# Patient Record
Sex: MALE | Race: WHITE | NOT HISPANIC OR LATINO | URBAN - NONMETROPOLITAN AREA
[De-identification: names, ages, dates, MRNs, and addresses within clinical notes are randomized per-mention and may not be internally consistent; named-entity substitution may affect disease eponyms.]

---

## 2019-02-06 ENCOUNTER — IMPORTED ENCOUNTER (OUTPATIENT)
Dept: URBAN - NONMETROPOLITAN AREA CLINIC 1 | Facility: CLINIC | Age: 74
End: 2019-02-06

## 2019-02-06 PROBLEM — H16.223: Noted: 2019-02-06

## 2019-02-06 PROBLEM — H25.13: Noted: 2019-02-06

## 2019-02-06 PROBLEM — E11.9: Noted: 2019-02-06

## 2019-02-06 PROCEDURE — 99212 OFFICE O/P EST SF 10 MIN: CPT

## 2019-02-06 NOTE — PATIENT DISCUSSION
Dry Eye Syndrome OU- Continue current management.- Recommended artificial tears to use as directed. - Discussed lid hygiene warm compress and eyelid wash. - Explained to patient that the glare problem could be due to cataracts and glare test would be performed on his next scheduled visit. *******************************Previous Exam************************************NIDDM s DR LOPEZ-Stressed the importance of keeping blood sugars under control blood pressure under control and weight normalization and regular visits with PCP. -Explained the possible effects of poorly controlled diabetes and the damage that diabetes can cause to ocular health. -Patient to check HgbA1C.-Pt instructed to contact our office with any vision changes. Cataract OU-Not yet surgical. -Reviewed symptoms of advancing cataract growth such as glare and halos and decreased vision.-Continue to monitor for now. Pt will notify us if any new symptoms develop. - Recommend glare test upon return for yearly routine exam.Presbyopia OU-Discussed diagnosis with patient.

## 2019-08-20 ENCOUNTER — IMPORTED ENCOUNTER (OUTPATIENT)
Dept: URBAN - NONMETROPOLITAN AREA CLINIC 1 | Facility: CLINIC | Age: 74
End: 2019-08-20

## 2019-08-20 PROBLEM — E11.9: Noted: 2019-08-20

## 2019-08-20 PROBLEM — H16.223: Noted: 2019-08-20

## 2019-08-20 PROBLEM — H25.13: Noted: 2019-08-20

## 2019-08-20 PROCEDURE — 99214 OFFICE O/P EST MOD 30 MIN: CPT

## 2019-08-20 PROCEDURE — 92250 FUNDUS PHOTOGRAPHY W/I&R: CPT

## 2019-08-20 PROCEDURE — 92015 DETERMINE REFRACTIVE STATE: CPT

## 2019-08-20 NOTE — PATIENT DISCUSSION
NIDDM s DR LOPEZ-Stressed the importance of keeping blood sugars under control blood pressure under control and weight normalization and regular visits with PCP. -Explained the possible effects of poorly controlled diabetes and the damage that diabetes can cause to ocular health. -Patient to check HgbA1C.-Pt instructed to contact our office with any vision changes. Cataract OU-Not yet surgical. -Reviewed symptoms of advancing cataract growth such as glare and halos and decreased vision.-Continue to monitor for now. Pt will notify us if any new symptoms develop. - Recommend glare test upon return for yearly routine exam.Presbyopia OU-Discussed diagnosis with patient. Dry Eye Syndrome OU- Continue current management.- Recommended artificial tears to use as directed. - Discussed lid hygiene warm compress and eyelid wash. - Explained to patient that the glare problem could be due to cataracts and glare test would be performed on his next scheduled visit.

## 2021-04-12 ENCOUNTER — IMPORTED ENCOUNTER (OUTPATIENT)
Dept: URBAN - NONMETROPOLITAN AREA CLINIC 1 | Facility: CLINIC | Age: 76
End: 2021-04-12

## 2021-04-12 PROCEDURE — 99213 OFFICE O/P EST LOW 20 MIN: CPT

## 2021-04-12 PROCEDURE — 92250 FUNDUS PHOTOGRAPHY W/I&R: CPT

## 2021-09-22 ENCOUNTER — IMPORTED ENCOUNTER (OUTPATIENT)
Dept: URBAN - NONMETROPOLITAN AREA CLINIC 1 | Facility: CLINIC | Age: 76
End: 2021-09-22

## 2021-09-22 PROBLEM — I10: Noted: 2021-10-05

## 2021-09-22 PROBLEM — Z01.818: Noted: 2021-10-05

## 2021-09-22 PROBLEM — K21.9: Noted: 2021-10-05

## 2021-09-22 PROBLEM — H25.13: Noted: 2021-09-22

## 2021-09-22 PROBLEM — H16.223: Noted: 2019-08-20

## 2021-09-22 PROBLEM — E11.9: Noted: 2021-09-22

## 2021-09-22 PROBLEM — E78.5: Noted: 2021-10-05

## 2021-09-22 PROBLEM — I25.10: Noted: 2021-10-05

## 2021-09-22 PROCEDURE — 92014 COMPRE OPH EXAM EST PT 1/>: CPT

## 2021-09-22 NOTE — PATIENT DISCUSSION
Cataract(s)-Visually significant cataract OU .-Cataract(s) causing symptomatic impairment of visual function not correctable with a tolerable change in glasses or contact lenses lighting or non-operative means resulting in specific activity limitations and/or participation restrictions including but not limited to reading viewing television driving or meeting vocational or recreational needs. -Expectation is clearer vision and functional improvement in symptoms as well as reduced glare disability after cataract removal.-Order IOLMaster and OPD today. -Recommend Stand/Trad based on today's OPD testing and lifestyle questionnaire.-All questions were answered regarding surgery including pre and post-op medications appointments activity restrictions and anesthetic usage.-The risks benefits and alternatives and special risk factors for the patient were discussed in detail including but not limited to: bleeding infection retinal detachment vitreous loss problems with the implant and possible need for additional surgery.-Although rare the possibility of complete vision loss was discussed.-The possible need for glasses post-operatively was discussed.-Order medical clearance exam based on history of diabetes-Patient elects to proceed with cataract surgery OS . Will schedule at patient's convenience and re-evaluate O D in the future. Pt elects Stand/Trad OU and discussed need for bifocals s/p surgeryPost op inflammation anticipated discussed dextenza insertion after surgery

## 2021-09-30 ENCOUNTER — IMPORTED ENCOUNTER (OUTPATIENT)
Dept: URBAN - NONMETROPOLITAN AREA CLINIC 1 | Facility: CLINIC | Age: 76
End: 2021-09-30

## 2021-09-30 PROBLEM — H16.223: Noted: 2021-09-30

## 2021-09-30 PROBLEM — E11.9: Noted: 2021-09-30

## 2021-09-30 PROBLEM — H25.13: Noted: 2021-09-30

## 2021-10-05 ENCOUNTER — IMPORTED ENCOUNTER (OUTPATIENT)
Dept: URBAN - NONMETROPOLITAN AREA CLINIC 1 | Facility: CLINIC | Age: 76
End: 2021-10-05

## 2021-10-05 PROCEDURE — 99214 OFFICE O/P EST MOD 30 MIN: CPT

## 2021-11-05 ENCOUNTER — IMPORTED ENCOUNTER (OUTPATIENT)
Dept: URBAN - NONMETROPOLITAN AREA CLINIC 1 | Facility: CLINIC | Age: 76
End: 2021-11-05

## 2021-11-05 PROCEDURE — 0356T INSERTION OF DRUG-ELUTING IMPLANT (INCLUDING PUNCTAL DILATION AND IMPLANT REMOVAL WHEN PERFORMED) INTO LACRIMAL CANALICULUS, EACH: CPT

## 2021-11-05 PROCEDURE — 66982 XCAPSL CTRC RMVL CPLX WO ECP: CPT

## 2021-11-05 PROCEDURE — 92136 OPHTHALMIC BIOMETRY: CPT

## 2021-11-05 PROCEDURE — 99024 POSTOP FOLLOW-UP VISIT: CPT

## 2021-11-05 NOTE — PATIENT DISCUSSION
Cataract(s)-Visually significant cataract OD. -Cataract(s) causing symptomatic impairment of visual function not correctable with a tolerable change in glasses or contact lenses lighting or non-operative means resulting in specific activity limitations and/or participation restrictions including but not limited to reading viewing television driving or meeting vocational or recreational needs. -Expectation is clearer vision and functional improvement in symptoms as well as reduced glare disability after cataract removal.-Recommend Standard/Trad based on previous OPD testing and lifestyle questionnaire.-All questions were answered regarding surgery including pre and post-op medications appointments activity restrictions and anesthetic usage.-The risks benefits and alternatives and special risk factors for the patient were discussed in detail including but not limited to: bleeding infection retinal detachment vitreous loss problems with the implant and possible need for additional surgery.-Although rare the possibility of complete vision loss was discussed.-The possible need for glasses post-operatively was discussed.-Order medical clearance exam based on history of diabetes and hypertension.-Patient elects to proceed with cataract surgery OD. s/p PCIOL-Pt doing well s/p PCIOL. -Continue post-op gtts according to instruction sheet and sleep with eye shield over eye for 7 nights.-Avoid bending at the waist lifting anything over 5lbs and dirty or amy environments. -RTC .

## 2021-11-08 PROBLEM — Z98.42: Noted: 2021-11-08

## 2021-11-08 PROBLEM — H25.811: Noted: 2021-11-08

## 2022-04-09 ASSESSMENT — TONOMETRY
OS_IOP_MMHG: 12
OD_IOP_MMHG: 12
OS_IOP_MMHG: 14
OS_IOP_MMHG: 12
OS_IOP_MMHG: 14
OD_IOP_MMHG: 10
OD_IOP_MMHG: 12
OS_IOP_MMHG: 10
OD_IOP_MMHG: 14
OD_IOP_MMHG: 10

## 2022-04-09 ASSESSMENT — VISUAL ACUITY
OD_CC: 20/70
OD_GLARE: 20/200
OD_PAM: 20/30
OD_GLARE: 20/200
OD_GLARE: 20/200
OD_SC: 20/25-1
OD_PAM: 20/30
OD_GLARE: 20/200
OS_CC: 20/400
OD_SC: 20/30-
OS_GLARE: 20/40
OD_PAM: 20/30
OS_CC: 20/200
OS_SC: 20/20-
OD_GLARE: 20/30
OD_CC: J1
OS_SC: 20/400
OS_SC: 20/30-2
OD_SC: 20/25-1
OD_SC: 20/20-
OS_PH: 20/200
OS_AM: 20/70
OS_CC: 20/70+
OS_SC: 20/30-2
OS_CC: J1
OD_CC: 20/100-